# Patient Record
Sex: MALE | Race: WHITE | NOT HISPANIC OR LATINO | Employment: STUDENT | URBAN - NONMETROPOLITAN AREA
[De-identification: names, ages, dates, MRNs, and addresses within clinical notes are randomized per-mention and may not be internally consistent; named-entity substitution may affect disease eponyms.]

---

## 2022-04-20 ENCOUNTER — OFFICE VISIT (OUTPATIENT)
Dept: PSYCHIATRY | Facility: CLINIC | Age: 22
End: 2022-04-20

## 2022-04-20 VITALS
BODY MASS INDEX: 34.39 KG/M2 | HEART RATE: 84 BPM | WEIGHT: 268 LBS | HEIGHT: 74 IN | DIASTOLIC BLOOD PRESSURE: 93 MMHG | SYSTOLIC BLOOD PRESSURE: 148 MMHG

## 2022-04-20 DIAGNOSIS — Z79.899 MEDICATION MANAGEMENT: ICD-10-CM

## 2022-04-20 DIAGNOSIS — F84.0 AUTISM SPECTRUM DISORDER: Primary | ICD-10-CM

## 2022-04-20 DIAGNOSIS — F90.0 ADHD (ATTENTION DEFICIT HYPERACTIVITY DISORDER), INATTENTIVE TYPE: ICD-10-CM

## 2022-04-20 LAB
EXTERNAL AMPHETAMINE SCREEN URINE: NEGATIVE
EXTERNAL BENZODIAZEPINE SCREEN URINE: NEGATIVE
EXTERNAL BUPRENORPHINE SCREEN URINE: NEGATIVE
EXTERNAL COCAINE SCREEN URINE: NEGATIVE
EXTERNAL MDMA: NEGATIVE
EXTERNAL METHADONE SCREEN URINE: NEGATIVE
EXTERNAL METHAMPHETAMINE SCREEN URINE: NEGATIVE
EXTERNAL OPIATES SCREEN URINE: NEGATIVE
EXTERNAL OXYCODONE SCREEN URINE: NEGATIVE
EXTERNAL THC SCREEN URINE: NEGATIVE

## 2022-04-20 PROCEDURE — 90792 PSYCH DIAG EVAL W/MED SRVCS: CPT | Performed by: NURSE PRACTITIONER

## 2022-06-10 ENCOUNTER — OFFICE VISIT (OUTPATIENT)
Dept: PSYCHIATRY | Facility: CLINIC | Age: 22
End: 2022-06-10

## 2022-06-10 VITALS
HEIGHT: 74 IN | WEIGHT: 262.4 LBS | SYSTOLIC BLOOD PRESSURE: 141 MMHG | HEART RATE: 89 BPM | BODY MASS INDEX: 33.67 KG/M2 | DIASTOLIC BLOOD PRESSURE: 77 MMHG

## 2022-06-10 DIAGNOSIS — F90.0 ADHD (ATTENTION DEFICIT HYPERACTIVITY DISORDER), INATTENTIVE TYPE: Primary | ICD-10-CM

## 2022-06-10 DIAGNOSIS — F84.0 AUTISM SPECTRUM DISORDER: ICD-10-CM

## 2022-06-10 PROCEDURE — 99214 OFFICE O/P EST MOD 30 MIN: CPT | Performed by: NURSE PRACTITIONER

## 2022-06-10 NOTE — PROGRESS NOTES
Subjective   Emanuel Naranjo is a 22 y.o. male who presents today for follow up    Chief Complaint:  ADHD    History of Present Illness:   Emanuel presents today for medication management follow up. Verbalizes that he is doing well with medication. Denies any adverse effects. Reports that he continues to become distracted, but is able to remain on task and can get tasks completed. Feels that current dose is adequate as he was on same medication in past (Vyvanse) that worked well to control symptoms. Initially dx with ADHD and ASD at age 4 and was on medication for several years, but says medication was stopped a few years ago because his father did not feel that he needed medication. Says that he had struggled since stopping medication, his grades dropped drastically. Continues to deny any symptoms of depression or anxiety. Sleeping between 8-10 hours per night.  Appetite is good. Denies any CP/palpitations, SI/HI or A/V hallucinations.      The following portions of the patient's history were reviewed and updated as appropriate: allergies, current medications, past family history, past medical history, past social history, past surgical history and problem list.      Past Medical History:  Past Medical History:   Diagnosis Date   • ADHD (attention deficit hyperactivity disorder)    • Autism        Social History:  Social History     Socioeconomic History   • Marital status: Single   Tobacco Use   • Smoking status: Never Smoker   • Smokeless tobacco: Never Used   Vaping Use   • Vaping Use: Never used   Substance and Sexual Activity   • Alcohol use: Not Currently     Comment: once a year   • Drug use: Never   • Sexual activity: Defer       Family History:  Family History   Problem Relation Age of Onset   • Alcohol abuse Maternal Uncle    • Schizophrenia Maternal Uncle    • Schizophrenia Paternal Uncle        Past Surgical History:  History reviewed. No pertinent surgical history.    Problem List:  There is no problem  "list on file for this patient.      Allergy:   No Known Allergies     Current Medications:   Current Outpatient Medications   Medication Sig Dispense Refill   • lisdexamfetamine (Vyvanse) 20 MG capsule Take 1 capsule by mouth Every Morning for 30 days 30 capsule 0     No current facility-administered medications for this visit.       Review of Symptoms:    Review of Systems   Constitutional: Negative for appetite change and fatigue.   Respiratory: Negative for shortness of breath.    Cardiovascular: Negative for chest pain and palpitations.   Psychiatric/Behavioral: Positive for decreased concentration. Negative for dysphoric mood, sleep disturbance and suicidal ideas. The patient is not nervous/anxious.      Physical Exam:   Physical Exam  Vitals reviewed.   Constitutional:       General: He is not in acute distress.     Appearance: Normal appearance.   Neurological:      Mental Status: He is alert.      Gait: Gait normal.     Vitals:   Blood pressure 141/77, pulse 89, height 188 cm (74.02\"), weight 119 kg (262 lb 6.4 oz).    Mental Status Exam:   Hygiene:   good  Cooperation:  Cooperative  Eye Contact:  Fair  Psychomotor Behavior:  Appropriate  Affect:  Flat  Mood: normal  Hopelessness: Denies  Speech:  Monotone  Thought Process:  Linear  Thought Content:  Mood congruent  Suicidal:  None  Homicidal:  None  Hallucinations:  None  Delusion:  None  Memory:  Intact  Orientation:  Person, Place, Time and Situation  Reliability:  fair  Insight:  Fair  Judgement:  Fair  Impulse Control:  Fair    Lab Results:   Office Visit on 04/20/2022   Component Date Value Ref Range Status   • External Amphetamine Screen Urine 04/20/2022 Negative   Final   • External Benzodiazepine Screen Uri* 04/20/2022 Negative   Final   • External Cocaine Screen Urine 04/20/2022 Negative   Final   • External THC Screen Urine 04/20/2022 Negative   Final   • External Methadone Screen Urine 04/20/2022 Negative   Final   • External Methamphetamine " Screen Ur* 04/20/2022 Negative   Final   • External Oxycodone Screen Urine 04/20/2022 Negative   Final   • External Buprenorphine Screen Urine 04/20/2022 Negative   Final   • External MDMA 04/20/2022 Negative   Final   • External Opiates Screen Urine 04/20/2022 Negative   Final       EKG Results:  No orders to display       Assessment & Plan   Problems Addressed this Visit    None     Visit Diagnoses     ADHD (attention deficit hyperactivity disorder), inattentive type    -  Primary    Relevant Medications    lisdexamfetamine (Vyvanse) 20 MG capsule    Autism spectrum disorder        Relevant Medications    lisdexamfetamine (Vyvanse) 20 MG capsule      Diagnoses       Codes Comments    ADHD (attention deficit hyperactivity disorder), inattentive type    -  Primary ICD-10-CM: F90.0  ICD-9-CM: 314.00     Autism spectrum disorder     ICD-10-CM: F84.0  ICD-9-CM: 299.00           Visit Diagnoses:    ICD-10-CM ICD-9-CM   1. ADHD (attention deficit hyperactivity disorder), inattentive type  F90.0 314.00   2. Autism spectrum disorder  F84.0 299.00       GOALS:  Short Term Goals: Patient will be compliant with medication, and patient will have no significant medication related side effects.  Patient will be engaged in psychotherapy as indicated.  Patient will report subjective improvement of symptoms.  Long term goals: To stabilize mood and treat/improve subjective symptoms, the patient will stay out of the hospital, the patient will be at an optimal level of functioning, and the patient will take all medications as prescribed.  The patient/guardian verbalized understanding and agreement with goals that were mutually set.    TREATMENT PLAN: Continue supportive psychotherapy efforts and medications as indicated for patient's diagnosis.  Pharmacological and Non-Pharmacological treatment options discussed during today's visit. Patient/Guardian acknowledged and verbally consented with current treatment plan and was educated on  the importance of compliance with treatment and follow-up appointments.      Discussed medication options and treatment plan of prescribed medication as well as the risks, benefits, any black box warnings, and side effects including potential falls, possible impaired driving, and metabolic adversities among others. Patient is agreeable to call the office with any worsening of symptoms or onset of side effects, or if any concerns or questions arise.  The contact information for the office is made available to the patient. Patient is agreeable to call 911 or go to the nearest ER should they begin having any SI/HI, or if any urgent concerns arise. No medication side effects or related complaints today.     -Reviewed most recent available documentation, labs/UDS.     -RICHIE reviewed and is appropriate     -CPT done 4/20/22 resulting in moderate likelihood of having a disorder characterized by attention deficits.     -The benefits of a healthy diet and exercise were discussed with patient, especially the positive effects they have on mental health. Patient encouraged to consider lifestyle modification regarding diet and exercise patterns to maximize results of mental health treatment.     The patient is being prescribed a controlled substance as part of the treatment plan. The patient/guardian has been educated of appropriate use of the medication(s), including risks and side effects such as insomnia, headache, exacerbation of tics, nervousness, irritability, overstimulation, tremor, dizziness, anorexia or change in appetite, nausea, dry mouth, constipation, diarrhea, weight loss, sexual dysfunction, psychotic episodes, seizures, palpitations, tachycardia, hypertension, rare activation (activation of hypomania, edin, and/or suicidal ideations), cardiovascular adverse effects including sudden death (especially patients with pre-existing structural abnormalities often associated with a family history of cardiac disease),  may slow normal growth in children, weight gain is reported but not expected, sedation is possible but activation is much more common, metabolic adversities, and overdose among others. Patient/guardian is also informed that the medication is to be used by the patient only, the medication is to be used only as directed, and the medication should not be combined with other substances unless directed by a Provider/Prescriber. The patient/guardian verbalized understanding and agreement with this in their own words and wish to continue with current treatment plan.     -Continue Lisdexamfetamine 20 mg daily for symptoms associated with ADHD.     MEDS ORDERED DURING VISIT:  New Medications Ordered This Visit   Medications   • lisdexamfetamine (Vyvanse) 20 MG capsule     Sig: Take 1 capsule by mouth Every Morning for 30 days     Dispense:  30 capsule     Refill:  0       FOLLOW UP:  Return in about 3 months (around 9/10/2022) for Recheck.    I spent 30 minutes caring for Emanuel on this date of service. This time includes time spent by me in the following activities: preparing for the visit, obtaining and/or reviewing a separately obtained history, performing a medically appropriate examination and/or evaluation, counseling and educating the patient/family/caregiver, ordering medications, tests, or procedures and documenting information in the medical record.           This document has been electronically signed by MONA Zuleta  Liegh 10, 2022 11:56 EDT    Please note that portions of this note were completed with a voice recognition program. Efforts were made to edit dictation, but occasionally words are mistranscribed.

## 2022-07-19 DIAGNOSIS — F90.0 ADHD (ATTENTION DEFICIT HYPERACTIVITY DISORDER), INATTENTIVE TYPE: ICD-10-CM

## 2022-08-29 DIAGNOSIS — F90.0 ADHD (ATTENTION DEFICIT HYPERACTIVITY DISORDER), INATTENTIVE TYPE: ICD-10-CM

## 2022-09-27 DIAGNOSIS — F90.0 ADHD (ATTENTION DEFICIT HYPERACTIVITY DISORDER), INATTENTIVE TYPE: ICD-10-CM

## 2022-11-09 DIAGNOSIS — F90.0 ADHD (ATTENTION DEFICIT HYPERACTIVITY DISORDER), INATTENTIVE TYPE: ICD-10-CM
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
## 2022-12-15 ENCOUNTER — LAB (OUTPATIENT)
Dept: LAB | Facility: HOSPITAL | Age: 22
End: 2022-12-15

## 2022-12-15 ENCOUNTER — TELEPHONE (OUTPATIENT)
Dept: PSYCHIATRY | Facility: CLINIC | Age: 22
End: 2022-12-15

## 2022-12-15 ENCOUNTER — HOSPITAL ENCOUNTER (OUTPATIENT)
Dept: RESPIRATORY THERAPY | Facility: HOSPITAL | Age: 22
Discharge: HOME | End: 2022-12-15

## 2022-12-15 ENCOUNTER — OFFICE VISIT (OUTPATIENT)
Dept: PSYCHIATRY | Facility: CLINIC | Age: 22
End: 2022-12-15

## 2022-12-15 VITALS
SYSTOLIC BLOOD PRESSURE: 140 MMHG | DIASTOLIC BLOOD PRESSURE: 86 MMHG | HEIGHT: 70 IN | HEART RATE: 90 BPM | WEIGHT: 244 LBS | BODY MASS INDEX: 34.93 KG/M2

## 2022-12-15 DIAGNOSIS — F32.2 CURRENT SEVERE EPISODE OF MAJOR DEPRESSIVE DISORDER WITHOUT PSYCHOTIC FEATURES, UNSPECIFIED WHETHER RECURRENT: ICD-10-CM

## 2022-12-15 DIAGNOSIS — F84.0 AUTISM SPECTRUM DISORDER: ICD-10-CM

## 2022-12-15 DIAGNOSIS — R07.9 CHEST PAIN, UNSPECIFIED TYPE: ICD-10-CM

## 2022-12-15 DIAGNOSIS — F79 INTELLECTUAL DISABILITY: ICD-10-CM

## 2022-12-15 DIAGNOSIS — Z79.899 MEDICATION MANAGEMENT: ICD-10-CM

## 2022-12-15 DIAGNOSIS — F90.0 ADHD (ATTENTION DEFICIT HYPERACTIVITY DISORDER), INATTENTIVE TYPE: ICD-10-CM

## 2022-12-15 DIAGNOSIS — F32.2 CURRENT SEVERE EPISODE OF MAJOR DEPRESSIVE DISORDER WITHOUT PSYCHOTIC FEATURES, UNSPECIFIED WHETHER RECURRENT: Primary | ICD-10-CM

## 2022-12-15 LAB
25(OH)D3 SERPL-MCNC: 14.7 NG/ML (ref 30–100)
ALBUMIN SERPL-MCNC: 4 G/DL (ref 3.5–5.2)
ALBUMIN/GLOB SERPL: 1.4 G/DL
ALP SERPL-CCNC: 64 U/L (ref 39–117)
ALT SERPL W P-5'-P-CCNC: 32 U/L (ref 1–41)
ANION GAP SERPL CALCULATED.3IONS-SCNC: 6.8 MMOL/L (ref 5–15)
AST SERPL-CCNC: 9 U/L (ref 1–40)
BASOPHILS # BLD AUTO: 0.07 10*3/MM3 (ref 0–0.2)
BASOPHILS NFR BLD AUTO: 1 % (ref 0–1.5)
BILIRUB SERPL-MCNC: 0.3 MG/DL (ref 0–1.2)
BUN SERPL-MCNC: 7 MG/DL (ref 6–20)
BUN/CREAT SERPL: 10.1 (ref 7–25)
CALCIUM SPEC-SCNC: 9.1 MG/DL (ref 8.6–10.5)
CHLORIDE SERPL-SCNC: 106 MMOL/L (ref 98–107)
CHOLEST SERPL-MCNC: 159 MG/DL (ref 0–200)
CO2 SERPL-SCNC: 28.2 MMOL/L (ref 22–29)
CREAT SERPL-MCNC: 0.69 MG/DL (ref 0.76–1.27)
DEPRECATED RDW RBC AUTO: 36.9 FL (ref 37–54)
EGFRCR SERPLBLD CKD-EPI 2021: 133.4 ML/MIN/1.73
EOSINOPHIL # BLD AUTO: 0.12 10*3/MM3 (ref 0–0.4)
EOSINOPHIL NFR BLD AUTO: 1.7 % (ref 0.3–6.2)
ERYTHROCYTE [DISTWIDTH] IN BLOOD BY AUTOMATED COUNT: 13.6 % (ref 12.3–15.4)
EXTERNAL AMPHETAMINE SCREEN URINE: NEGATIVE
EXTERNAL BENZODIAZEPINE SCREEN URINE: NEGATIVE
EXTERNAL BUPRENORPHINE SCREEN URINE: NEGATIVE
EXTERNAL COCAINE SCREEN URINE: NEGATIVE
EXTERNAL MDMA: NEGATIVE
EXTERNAL METHADONE SCREEN URINE: NEGATIVE
EXTERNAL METHAMPHETAMINE SCREEN URINE: NEGATIVE
EXTERNAL OPIATES SCREEN URINE: NEGATIVE
EXTERNAL OXYCODONE SCREEN URINE: NEGATIVE
EXTERNAL THC SCREEN URINE: NEGATIVE
GLOBULIN UR ELPH-MCNC: 2.9 GM/DL
GLUCOSE SERPL-MCNC: 104 MG/DL (ref 65–99)
HCT VFR BLD AUTO: 44.7 % (ref 37.5–51)
HDLC SERPL-MCNC: 28 MG/DL (ref 40–60)
HGB BLD-MCNC: 14.3 G/DL (ref 13–17.7)
IMM GRANULOCYTES # BLD AUTO: 0.02 10*3/MM3 (ref 0–0.05)
IMM GRANULOCYTES NFR BLD AUTO: 0.3 % (ref 0–0.5)
LDLC SERPL CALC-MCNC: 89 MG/DL (ref 0–100)
LDLC/HDLC SERPL: 2.91 {RATIO}
LYMPHOCYTES # BLD AUTO: 2.11 10*3/MM3 (ref 0.7–3.1)
LYMPHOCYTES NFR BLD AUTO: 30.5 % (ref 19.6–45.3)
MCH RBC QN AUTO: 24.4 PG (ref 26.6–33)
MCHC RBC AUTO-ENTMCNC: 32 G/DL (ref 31.5–35.7)
MCV RBC AUTO: 76.4 FL (ref 79–97)
MONOCYTES # BLD AUTO: 0.55 10*3/MM3 (ref 0.1–0.9)
MONOCYTES NFR BLD AUTO: 7.9 % (ref 5–12)
NEUTROPHILS NFR BLD AUTO: 4.05 10*3/MM3 (ref 1.7–7)
NEUTROPHILS NFR BLD AUTO: 58.6 % (ref 42.7–76)
NRBC BLD AUTO-RTO: 0 /100 WBC (ref 0–0.2)
PLATELET # BLD AUTO: 315 10*3/MM3 (ref 140–450)
PMV BLD AUTO: 10.5 FL (ref 6–12)
POTASSIUM SERPL-SCNC: 3.7 MMOL/L (ref 3.5–5.2)
PROT SERPL-MCNC: 6.9 G/DL (ref 6–8.5)
QT INTERVAL: 372 MS
QTC INTERVAL: 407 MS
RBC # BLD AUTO: 5.85 10*6/MM3 (ref 4.14–5.8)
SODIUM SERPL-SCNC: 141 MMOL/L (ref 136–145)
T4 FREE SERPL-MCNC: 0.93 NG/DL (ref 0.93–1.7)
TRIGL SERPL-MCNC: 248 MG/DL (ref 0–150)
TSH SERPL DL<=0.05 MIU/L-ACNC: 1.44 UIU/ML (ref 0.27–4.2)
VIT B12 BLD-MCNC: 274 PG/ML (ref 211–946)
VLDLC SERPL-MCNC: 42 MG/DL (ref 5–40)
WBC NRBC COR # BLD: 6.92 10*3/MM3 (ref 3.4–10.8)

## 2022-12-15 PROCEDURE — 84439 ASSAY OF FREE THYROXINE: CPT

## 2022-12-15 PROCEDURE — 90792 PSYCH DIAG EVAL W/MED SRVCS: CPT | Performed by: NURSE PRACTITIONER

## 2022-12-15 PROCEDURE — 93010 ELECTROCARDIOGRAM REPORT: CPT | Performed by: INTERNAL MEDICINE

## 2022-12-15 PROCEDURE — 82607 VITAMIN B-12: CPT

## 2022-12-15 PROCEDURE — 82306 VITAMIN D 25 HYDROXY: CPT

## 2022-12-15 PROCEDURE — 80050 GENERAL HEALTH PANEL: CPT

## 2022-12-15 PROCEDURE — 80061 LIPID PANEL: CPT

## 2022-12-15 PROCEDURE — 93005 ELECTROCARDIOGRAM TRACING: CPT | Performed by: NURSE PRACTITIONER

## 2022-12-15 RX ORDER — ARIPIPRAZOLE 5 MG/1
5 TABLET ORAL DAILY
Qty: 30 TABLET | Refills: 1 | Status: SHIPPED | OUTPATIENT
Start: 2022-12-15 | End: 2023-02-09 | Stop reason: SDUPTHER

## 2022-12-15 NOTE — TELEPHONE ENCOUNTER
Per Karol Fontanez request, I called and spoke with [facility] in [place] to cancel patients Vyvanse prescription

## 2022-12-16 ENCOUNTER — TELEPHONE (OUTPATIENT)
Dept: PSYCHIATRY | Facility: CLINIC | Age: 22
End: 2022-12-16

## 2022-12-16 NOTE — TELEPHONE ENCOUNTER
I attempted to call patient but the contact number on file had been changed or not in service. A letter was generated and sent asking patient to contact PCP for a follow up on abnormal labs.

## 2022-12-16 NOTE — TELEPHONE ENCOUNTER
----- Message from MONA Choi sent at 12/16/2022  6:30 AM EST -----  Would you notify patient, recommend f/u with PCP lipid panel and vitamin D levels abnormal.    Thank you  ----- Message -----  From: Lab, Background User  Sent: 12/15/2022   6:20 PM EST  To: MONA Choi

## 2023-01-25 ENCOUNTER — OFFICE VISIT (OUTPATIENT)
Dept: PSYCHIATRY | Facility: CLINIC | Age: 23
End: 2023-01-25
Payer: COMMERCIAL

## 2023-01-25 DIAGNOSIS — F41.1 GENERALIZED ANXIETY DISORDER: ICD-10-CM

## 2023-01-25 DIAGNOSIS — F33.2 SEVERE EPISODE OF RECURRENT MAJOR DEPRESSIVE DISORDER, WITHOUT PSYCHOTIC FEATURES: Primary | ICD-10-CM

## 2023-01-25 DIAGNOSIS — F90.0 ADHD (ATTENTION DEFICIT HYPERACTIVITY DISORDER), INATTENTIVE TYPE: ICD-10-CM

## 2023-01-25 DIAGNOSIS — F84.0 AUTISM SPECTRUM DISORDER: ICD-10-CM

## 2023-01-25 PROCEDURE — 90791 PSYCH DIAGNOSTIC EVALUATION: CPT | Performed by: COUNSELOR

## 2023-02-01 ENCOUNTER — OFFICE VISIT (OUTPATIENT)
Dept: PSYCHIATRY | Facility: CLINIC | Age: 23
End: 2023-02-01
Payer: COMMERCIAL

## 2023-02-01 DIAGNOSIS — F90.0 ADHD (ATTENTION DEFICIT HYPERACTIVITY DISORDER), INATTENTIVE TYPE: ICD-10-CM

## 2023-02-01 DIAGNOSIS — F84.0 AUTISM SPECTRUM DISORDER: ICD-10-CM

## 2023-02-01 DIAGNOSIS — F33.1 MAJOR DEPRESSIVE DISORDER, RECURRENT EPISODE, MODERATE: ICD-10-CM

## 2023-02-01 DIAGNOSIS — F41.1 GENERALIZED ANXIETY DISORDER: Primary | ICD-10-CM

## 2023-02-01 PROCEDURE — 90834 PSYTX W PT 45 MINUTES: CPT | Performed by: COUNSELOR

## 2023-02-09 ENCOUNTER — OFFICE VISIT (OUTPATIENT)
Dept: PSYCHIATRY | Facility: CLINIC | Age: 23
End: 2023-02-09
Payer: COMMERCIAL

## 2023-02-09 VITALS
SYSTOLIC BLOOD PRESSURE: 142 MMHG | WEIGHT: 248.2 LBS | BODY MASS INDEX: 35.53 KG/M2 | HEIGHT: 70 IN | HEART RATE: 87 BPM | DIASTOLIC BLOOD PRESSURE: 88 MMHG

## 2023-02-09 DIAGNOSIS — Z79.899 MEDICATION MANAGEMENT: Primary | ICD-10-CM

## 2023-02-09 DIAGNOSIS — F32.2 CURRENT SEVERE EPISODE OF MAJOR DEPRESSIVE DISORDER WITHOUT PSYCHOTIC FEATURES, UNSPECIFIED WHETHER RECURRENT: ICD-10-CM

## 2023-02-09 DIAGNOSIS — F84.0 AUTISM SPECTRUM DISORDER: ICD-10-CM

## 2023-02-09 DIAGNOSIS — F90.0 ADHD (ATTENTION DEFICIT HYPERACTIVITY DISORDER), INATTENTIVE TYPE: ICD-10-CM

## 2023-02-09 DIAGNOSIS — F79 INTELLECTUAL DISABILITY: ICD-10-CM

## 2023-02-09 PROCEDURE — 99214 OFFICE O/P EST MOD 30 MIN: CPT | Performed by: NURSE PRACTITIONER

## 2023-02-09 RX ORDER — ARIPIPRAZOLE 5 MG/1
TABLET ORAL
Qty: 30 TABLET | Refills: 1 | Status: SHIPPED | OUTPATIENT
Start: 2023-02-09 | End: 2023-04-06 | Stop reason: SDUPTHER

## 2023-03-03 NOTE — PROGRESS NOTES
"    PROGRESS NOTE  Data:  Emanuel Naranjo came in 2/1/2023 for his regularly scheduled therapy session starting at 11:45 AM and ending at 12:30 PM, with Elena Boyer [facility].     (Scales based on 0 - 10 with 10 being the worst)  Depression: 4 Anxiety: 6     HPI:   Patient shared he is anxious \"thinking about the future going to [employer] or to [facility] [employer].\"    Patient learned that [employer] will also provide transportation to the pharmacy, college and the job shop.  He had to take a drug test for the job shop.  \"They said I am disqualified but I have a prescription for Vyvanse.  I am banned and cannot apply again for 6 months.\"    \"My computer broke and I cannot do my schoolwork.  I have no where to put my desk in the camper so I have to stay at [employer].\"    \"I need to talk to [facility].  I have not been able to get classes.\"    \"Pino complains and criticizes my mother.  I do not say anything.  I am fine I talk to my mother online.  She says she is good.  I do not think she has money for the electricity.\"    \"I gave my mom their $1400 to pay for the camper but I did not get any receipt or title.\"    \"Pino lives with [facility], she has plenty of food and she is nice.\"  \"My sister Lucero is 19 years old and lives with my mother's friend Caren.  She could not stay at [facility]'s.  Lucero was the best  in the state.  She had a scholarship for college but no one helped her and she did not ask me.  I would have helped her.\"    Clinical Maneuvering/Intervention:  Assisted patient in processing above session content; acknowledged and normalized patient’s thoughts, feelings, and concerns.     Patient seemed unaware that he could not start classes in the middle of the semester.  Looked up a phone number for a job corps and had him speak with the  giving all of his contact information to begin the process of getting a new admissions counselor to collect information regarding job corps " and if it would be a good fit for him.  Strongly encouraged patient to do whatever he can to get the title for the camper after paying $1400.  The camper is still sitting on the property of the man he bought it from.  Patient's living conditions sound very difficult.    Allowed patient to freely discuss issues without interruption or judgment. Provided safe, confidential environment to facilitate the development of positive therapeutic relationship and encourage open, honest communication. Assisted patient in identifying risk factors which would indicate the need for higher level of care including thoughts to harm self or others and/or self-harming behavior and encouraged patient to contact this office, call 911, or present to the nearest emergency room should any of these events occur. Discussed crisis intervention services and means to access.  Patient adamantly and convincingly denies current suicidal or homicidal ideation or perceptual disturbance.        Assessment     Patient is living in unsatisfactory conditions, with housing and emotional support.    Diagnosis:   Encounter Diagnoses   Name Primary?   • Generalized anxiety disorder Yes   • Major depressive disorder, recurrent episode, moderate (HCC)    • ADHD (attention deficit hyperactivity disorder), inattentive type    • Autism spectrum disorder        Generalized anxiety disorder [F41.1]    Mental Status Exam  Hygiene:  good  Dress:  casual  Attitude:  Cooperative  Motor Activity:  Appropriate  Speech:  Normal  Mood:  within normal limits  Affect:  calm and pleasant  Thought Processes:  Goal directed and Linear  Thought Content:  normal  Suicidal Thoughts:  denies  Homicidal Thoughts:  denies  Crisis Safety Plan: yes, to come to the emergency room.  Hallucinations:  denies          Patient's Support Network Includes:  parents    Progress toward goal: Not at goal    Functional Status: Moderate impairment     Prognosis: Good with Ongoing Treatment        Plan         Patient will adhere to medication regimen as prescribed and report any side effects. Patient will contact this office, call 911 or present to the nearest emergency room should suicidal or homicidal ideations occur. Provide Cognitive Behavioral Therapy and Integrative Therapy to improve functioning, maintain stability, and avoid decompensation and the need for higher level of care.            Return in about 3 weeks (around 2/22/2023).            This document electronically signed by Elena Boyer, MAYLIN, NCC, CSAT  March 3, 2023 17:26 EST    Plan

## 2023-04-05 ENCOUNTER — OFFICE VISIT (OUTPATIENT)
Dept: PSYCHIATRY | Facility: CLINIC | Age: 23
End: 2023-04-05
Payer: COMMERCIAL

## 2023-04-05 DIAGNOSIS — F41.1 GENERALIZED ANXIETY DISORDER: ICD-10-CM

## 2023-04-05 DIAGNOSIS — F33.1 MAJOR DEPRESSIVE DISORDER, RECURRENT EPISODE, MODERATE: Primary | ICD-10-CM

## 2023-04-05 DIAGNOSIS — F84.0 AUTISM SPECTRUM DISORDER: ICD-10-CM

## 2023-04-05 PROCEDURE — 1160F RVW MEDS BY RX/DR IN RCRD: CPT | Performed by: COUNSELOR

## 2023-04-05 PROCEDURE — 90837 PSYTX W PT 60 MINUTES: CPT | Performed by: COUNSELOR

## 2023-04-05 PROCEDURE — 1159F MED LIST DOCD IN RCRD: CPT | Performed by: COUNSELOR

## 2023-04-05 NOTE — PROGRESS NOTES
"Subjective   Emanuel Naranjo is a 23 y.o. male who presents today for follow up    Chief Complaint:  ADHD    History of Present Illness:   History of Present Illness  Today is a follow up visit. Patient is taking medication as directed,  He living with his cousin, he states it is a \"stable\" safe place to live. He isn't living with his father anymore. He continues in school, he states he got \"good grades\" in 1 of his classes, he took 2 classes, a lighter load. He is trying to catch up in his other class.   ADHD symptoms rated 6/10 with 10 being the worst. he isn't able to stay focused. He states he isn't able to get things done.   Sleep is good, getting about 9 hours of sleep a night, denies NM.  Appetite is good.   Denies SI/HI/AVH.  Denies thoughts of self-harm.  No acute physical or medical issues today. Denies any chest pain or shortness of breath.        The following portions of the patient's history were reviewed and updated as appropriate: allergies, current medications, past family history, past medical history, past social history, past surgical history and problem list.      Past Medical History:  Past Medical History:   Diagnosis Date   • ADHD (attention deficit hyperactivity disorder)    • Anxiety    • Autism    • Depression        Social History:  Social History     Socioeconomic History   • Marital status: Single   Tobacco Use   • Smoking status: Never   • Smokeless tobacco: Never   Vaping Use   • Vaping Use: Never used   Substance and Sexual Activity   • Alcohol use: Not Currently     Comment: once a year   • Drug use: Never   • Sexual activity: Defer       Family History:  Family History   Problem Relation Age of Onset   • Drug abuse Paternal Aunt    • Alcohol abuse Maternal Uncle    • Schizophrenia Maternal Uncle    • Schizophrenia Paternal Uncle        Past Surgical History:  History reviewed. No pertinent surgical history.    Problem List:  There is no problem list on file for this " "patient.      Allergy:   No Known Allergies     Current Medications:   Current Outpatient Medications   Medication Sig Dispense Refill   • ARIPiprazole (ABILIFY) 10 MG tablet Take 1 tablet at night 30 tablet 2     No current facility-administered medications for this visit.       Review of Symptoms:    Review of Systems   Psychiatric/Behavioral: Positive for decreased concentration, sleep disturbance, depressed mood and stress. Negative for dysphoric mood, hallucinations, self-injury and suicidal ideas (passive thoughts of self harm, denies plan or iintent to harm himself.). The patient is nervous/anxious.    All other systems reviewed and are negative.      Objective   Physical Exam:   Blood pressure 120/78, pulse 65, height 177.8 cm (70\"), weight 108 kg (237 lb).  Body mass index is 34.01 kg/m².    Appearance:  male appears stated age, no acute distress noted.    Gait, Station, Strength: Steady, posture erect, WNL      Mental Status Exam: 4/6/23  Hygiene:   fair  Cooperation:  Cooperative  Eye Contact:  Fair  Psychomotor Behavior:  Slow  Affect:  Restricted  Mood: anxious and irritable  Hopelessness: Denies  Speech:  Monotone  Thought Process:  Linear  Thought Content:  Mood congruent  Suicidal:  None   Homicidal:  None  Hallucinations:  None  Delusion:  None  Memory:  Deficits  Orientation:  Person, Place, Time and Situation  Reliability:  fair  Insight:  Poor  Judgement:  Poor  Impulse Control:  Poor       PHQ-Score Total:  PHQ-9 Total Score:      Lab Results:   No visits with results within 1 Month(s) from this visit.   Latest known visit with results is:   Office Visit on 02/09/2023   Component Date Value Ref Range Status   • External Amphetamine Screen Urine 02/09/2023 Negative   Final   • External Benzodiazepine Screen Uri* 02/09/2023 Negative   Final   • External Cocaine Screen Urine 02/09/2023 Negative   Final   • External THC Screen Urine 02/09/2023 Negative   Final   • External Methadone Screen " Urine 02/09/2023 Negative   Final   • External Methamphetamine Screen Ur* 02/09/2023 Negative   Final   • External Oxycodone Screen Urine 02/09/2023 Negative   Final   • External Buprenorphine Screen Urine 02/09/2023 Negative   Final   • External MDMA 02/09/2023 Negative   Final   • External Opiates Screen Urine 02/09/2023 Negative   Final       Assessment & Plan   Problems Addressed this Visit    None  Visit Diagnoses     Current severe episode of major depressive disorder without psychotic features, unspecified whether recurrent    -  Primary    Relevant Medications    ARIPiprazole (ABILIFY) 10 MG tablet    ADHD (attention deficit hyperactivity disorder), inattentive type        Relevant Medications    ARIPiprazole (ABILIFY) 10 MG tablet    Autism spectrum disorder        Relevant Medications    ARIPiprazole (ABILIFY) 10 MG tablet    Medication management          Diagnoses       Codes Comments    Current severe episode of major depressive disorder without psychotic features, unspecified whether recurrent    -  Primary ICD-10-CM: F32.2  ICD-9-CM: 296.23     ADHD (attention deficit hyperactivity disorder), inattentive type     ICD-10-CM: F90.0  ICD-9-CM: 314.00     Autism spectrum disorder     ICD-10-CM: F84.0  ICD-9-CM: 299.00     Medication management     ICD-10-CM: Z79.899  ICD-9-CM: V58.69         Social History     Tobacco Use   Smoking Status Never   Smokeless Tobacco Never     RICHIE reviewed and appropriate. Patient counseled on use of controlled substances.       -The benefits of a healthy diet and exercise were discussed with patient, especially the positive effects they have on mental health. Patient encouraged to consider lifestyle modification regarding  diet and exercise patterns to maximize results of mental health treatment.  -Reviewed previous available documentation  -Reviewed most recent available labs   -Lengthy discussion with patient on the possible side effects of antipsychotic medications  "including increased cholesterol, increased blood sugar, and possibility of weight gain.  Also discussed the need to monitor lab work associated with this.  The risk of muscle movement disorders with this class of medication was also discussed.      Visit Diagnoses:    ICD-10-CM ICD-9-CM   1. Current severe episode of major depressive disorder without psychotic features, unspecified whether recurrent  F32.2 296.23   2. ADHD (attention deficit hyperactivity disorder), inattentive type  F90.0 314.00   3. Autism spectrum disorder  F84.0 299.00   4. Medication management  Z79.899 V58.69         TREATMENT PLAN/GOALS: Continue supportive psychotherapy efforts and medications as indicated. Treatment and medication options discussed during today's visit. Patient acknowledged and verbally consented to continue with current treatment plan and was educated on the importance of compliance with treatment and follow-up appointments.    MEDICATION ISSUES:  Discussed medication options and treatment plan of prescribed medication as well as the risks, benefits, and side effects including potential falls, possible impaired driving and metabolic adversities among others. Patient is agreeable to call the office with any worsening of symptoms or onset of side effects. Patient is agreeable to call 911 or go to the nearest ER should he/she begin having SI/HI.     MEDS ORDERED DURING VISIT:  New Medications Ordered This Visit   Medications   • ARIPiprazole (ABILIFY) 10 MG tablet     Sig: Take 1 tablet at night     Dispense:  30 tablet     Refill:  2     -Increase Abilify 10 mg tablet, take 1 tablet at night for moods  -Patient requesting \"to be put back on ADHD\" medication, he has history of UDS positive for methamphetamine confirmed on 12/15/2023. I am not comfortable prescribing a controlled medication at this time. He had previously been prescribed Vyvanse, but states it didn't help, he specifically requests \"Adderall\".     Return in " about 3 months (around 7/6/2023).       Prognosis: Guarded dependent on medication/follow up and treatment plan compliance.  Functionality: pt showing improvements in important areas of daily functioning.     Short-term goals: Patient will adhere to medication regimen and note continued improvement in symptoms over the next 3 months.   Long-term goals: Patient will be adherent to medication management and psychotherapy with continued improvement in symptoms over the next 6 months    I spent 30 minutes caring for Emanuel on this date of service. This time includes time spent by me in the following activities: preparing for the visit, reviewing tests, obtaining and/or reviewing a separately obtained history, performing a medically appropriate examination and/or evaluation, counseling and educating the patient/family/caregiver, ordering medications, tests, or procedures and documenting information in the medical record        This document has been electronically signed by MONA Choi   April 6, 2023 13:30 EDT    Part of this note may be an electronic transcription/translation of spoken language to printed text using the Dragon Dictation System.

## 2023-04-06 ENCOUNTER — OFFICE VISIT (OUTPATIENT)
Dept: PSYCHIATRY | Facility: CLINIC | Age: 23
End: 2023-04-06
Payer: COMMERCIAL

## 2023-04-06 ENCOUNTER — TELEPHONE (OUTPATIENT)
Dept: PSYCHIATRY | Facility: CLINIC | Age: 23
End: 2023-04-06

## 2023-04-06 VITALS
WEIGHT: 237 LBS | SYSTOLIC BLOOD PRESSURE: 120 MMHG | HEIGHT: 70 IN | HEART RATE: 65 BPM | BODY MASS INDEX: 33.93 KG/M2 | DIASTOLIC BLOOD PRESSURE: 78 MMHG

## 2023-04-06 DIAGNOSIS — F84.0 AUTISM SPECTRUM DISORDER: ICD-10-CM

## 2023-04-06 DIAGNOSIS — F90.0 ADHD (ATTENTION DEFICIT HYPERACTIVITY DISORDER), INATTENTIVE TYPE: ICD-10-CM

## 2023-04-06 DIAGNOSIS — Z79.899 MEDICATION MANAGEMENT: ICD-10-CM

## 2023-04-06 DIAGNOSIS — F32.2 CURRENT SEVERE EPISODE OF MAJOR DEPRESSIVE DISORDER WITHOUT PSYCHOTIC FEATURES, UNSPECIFIED WHETHER RECURRENT: Primary | ICD-10-CM

## 2023-04-06 PROCEDURE — 99214 OFFICE O/P EST MOD 30 MIN: CPT | Performed by: NURSE PRACTITIONER

## 2023-04-06 PROCEDURE — 1160F RVW MEDS BY RX/DR IN RCRD: CPT | Performed by: NURSE PRACTITIONER

## 2023-04-06 PROCEDURE — 1159F MED LIST DOCD IN RCRD: CPT | Performed by: NURSE PRACTITIONER

## 2023-04-06 RX ORDER — ARIPIPRAZOLE 10 MG/1
TABLET ORAL
Qty: 30 TABLET | Refills: 2 | Status: SHIPPED | OUTPATIENT
Start: 2023-04-06

## 2023-04-06 NOTE — TELEPHONE ENCOUNTER
When pt checked out at the window he said that he could not wait to follow up with Karol for 3 months. He said that he needed to be put back on his ADHD meds. Spoke with Karol and she said due to his home surroundings and a positive uds she was not comfortable putting pt on the med. Made pt aware and he was insisting that he does not need the Abilify but ADHD med. Told pt that Karol had already said no. He said that he did not need the next appt he would have to find care some where else but when I ask if he wanted me to cancel his follow up he did not answer and left the office.

## 2023-05-04 NOTE — PROGRESS NOTES
"    PROGRESS NOTE  Data:  Emanuel Naranjo came in 3/5/2023 for his regularly scheduled therapy session starting at 1:30 PM and ending at 2:30 PM, with Elena Boyer [facility].     (Scales based on 0 - 10 with 10 being the worst)  Depression: 4 Anxiety: 2     HPI:   \"I am feeling very optimistic.  I hope I can find a medication works for me.  I am scared I will not get good grades and lose my Mauricio.  I am getting A's.  I feel confidence.\"    \"My mother stopped paying rent, they cut off the water and electric and I got kicked off the property.  I moved to [place].\"  Patient's father Riley is 49 years old.  His mother is 45.  \"My mother is working with the [employer] taking down guillermo.\"    \"My mother's friend, Judy, is in her 30s, I have known her for 2 years.  Her boyfriend tutored me, but we failed.\"    \"2 months ago I moved in with Judy's friend.  Her mom did not want her to have visitors or friends.  I had to hide and I did not like being on her property without permission.\"  \"I have been staying the last 3 weeks with Camden.  He is a vet and  to my mom's cousin, aunt Carolta's daughter Yennifer.  I think it is stable, and they invited me.  They had to move.  Camden is in college.  I have a good place to live and I am not starving.  I do dishes, take out the trash, clean up and cook dinner.\"    \"My mother told me she got the title for the [employer] but I do not trust her.  My mom goes back and forth between [facility] in the [facility].  She wants to move it but where would she move into?  I am glad she has a job.  She is walking everywhere.  Camden brought me here today.\"  \"I do not trust my mother to tell the truth or not to abandon me.  I trust her not to hurt me.  I love her and want her to be a stable person, but she does not want to be safe and happy.  She wants to galavant all over. I will never understand it.    \"I am taking antidepressants.\"    I am taking 2 half classes in environmental science and writing I.  I am " "retaking it the third time.\"  The teacher is giving me to the 9th to get caught up.\"    Gene Coleman fell through because they did not call back either to my mother or my aunt's phone numbers.  Now on 23 years old and it is too late.\"    Clinical Maneuvering/Intervention:  Assisted patient in processing above session content; acknowledged and normalized patient’s thoughts, feelings, and concerns.     Celebrated that patient has a more stable place to live with extended family who have invited him.  Expressed disappointment that patient was not able to hear back from [employer] before he turned 23.  Encouraged patient to demand that his mother give him the title for the camper that he paid for.  Encouraged patient to ask the [facility] to help him find a .    Allowed patient to freely discuss issues without interruption or judgment. Provided safe, confidential environment to facilitate the development of positive therapeutic relationship and encourage open, honest communication. Assisted patient in identifying risk factors which would indicate the need for higher level of care including thoughts to harm self or others and/or self-harming behavior and encouraged patient to contact this office, call 911, or present to the nearest emergency room should any of these events occur. Discussed crisis intervention services and means to access.  Patient adamantly and convincingly denies current suicidal or homicidal ideation or perceptual disturbance.        Assessment     Patient is more stable, positive and progressing.    Diagnosis:   Encounter Diagnoses   Name Primary?   • Major depressive disorder, recurrent episode, moderate Yes   • Generalized anxiety disorder    • Autism spectrum disorder        Major depressive disorder, recurrent episode, moderate [F33.1]    Mental Status Exam  Hygiene:  good  Dress:  casual  Attitude:  Cooperative  Motor Activity:  Appropriate  Speech:  Normal  Mood:  within normal limits  Affect:  " calm and pleasant  Thought Processes:  Goal directed and Linear  Thought Content:  normal  Suicidal Thoughts:  denies  Homicidal Thoughts:  denies  Crisis Safety Plan: yes, to come to the emergency room.  Hallucinations:  denies          Patient's Support Network Includes:  parents and extended family    Progress toward goal: Not at goal    Functional Status: Moderate impairment     Prognosis: Good with Ongoing Treatment       Plan         Patient will adhere to medication regimen as prescribed and report any side effects. Patient will contact this office, call 911 or present to the nearest emergency room should suicidal or homicidal ideations occur. Provide Cognitive Behavioral Therapy and Integrative Therapy to improve functioning, maintain stability, and avoid decompensation and the need for higher level of care.            Return in about 4 weeks (around 5/3/2023).            This document electronically signed by Elena Boyer, MAYLIN, NCC, CSAT  May 4, 2023 10:57 EDT    Plan